# Patient Record
Sex: MALE | Race: BLACK OR AFRICAN AMERICAN | NOT HISPANIC OR LATINO | Employment: UNEMPLOYED | URBAN - METROPOLITAN AREA
[De-identification: names, ages, dates, MRNs, and addresses within clinical notes are randomized per-mention and may not be internally consistent; named-entity substitution may affect disease eponyms.]

---

## 2022-02-04 ENCOUNTER — HOSPITAL ENCOUNTER (EMERGENCY)
Facility: HOSPITAL | Age: 32
Discharge: HOME | End: 2022-02-04
Attending: EMERGENCY MEDICINE | Admitting: EMERGENCY MEDICINE
Payer: COMMERCIAL

## 2022-02-04 ENCOUNTER — TRANSFERRED RECORDS (OUTPATIENT)
Dept: HEALTH INFORMATION MANAGEMENT | Facility: CLINIC | Age: 32
End: 2022-02-04

## 2022-02-04 ENCOUNTER — APPOINTMENT (OUTPATIENT)
Dept: RADIOLOGY | Facility: HOSPITAL | Age: 32
End: 2022-02-04
Attending: EMERGENCY MEDICINE
Payer: COMMERCIAL

## 2022-02-04 VITALS
DIASTOLIC BLOOD PRESSURE: 99 MMHG | RESPIRATION RATE: 25 BRPM | HEART RATE: 109 BPM | SYSTOLIC BLOOD PRESSURE: 208 MMHG | OXYGEN SATURATION: 98 % | WEIGHT: 235 LBS | BODY MASS INDEX: 30.16 KG/M2 | HEIGHT: 74 IN | TEMPERATURE: 98.5 F

## 2022-02-04 DIAGNOSIS — I10 HYPERTENSION, UNSPECIFIED TYPE: ICD-10-CM

## 2022-02-04 DIAGNOSIS — R07.89 ATYPICAL CHEST PAIN: ICD-10-CM

## 2022-02-04 LAB
ALBUMIN SERPL-MCNC: 4.3 G/DL (ref 3.5–5)
ALP SERPL-CCNC: 65 U/L (ref 45–120)
ALT SERPL W P-5'-P-CCNC: 54 U/L (ref 0–45)
ANION GAP SERPL CALCULATED.3IONS-SCNC: 8 MMOL/L (ref 5–18)
AST SERPL W P-5'-P-CCNC: 33 U/L (ref 0–40)
ATRIAL RATE - MUSE: 109 BPM
BASOPHILS # BLD AUTO: 0.1 10E3/UL (ref 0–0.2)
BASOPHILS NFR BLD AUTO: 1 %
BILIRUB DIRECT SERPL-MCNC: 0.1 MG/DL
BILIRUB SERPL-MCNC: 0.4 MG/DL (ref 0–1)
BUN SERPL-MCNC: 12 MG/DL (ref 8–22)
CALCIUM SERPL-MCNC: 9.8 MG/DL (ref 8.5–10.5)
CHLORIDE BLD-SCNC: 105 MMOL/L (ref 98–107)
CO2 SERPL-SCNC: 28 MMOL/L (ref 22–31)
CREAT SERPL-MCNC: 1.25 MG/DL (ref 0.7–1.3)
DIASTOLIC BLOOD PRESSURE - MUSE: NORMAL MMHG
EOSINOPHIL # BLD AUTO: 0.4 10E3/UL (ref 0–0.7)
EOSINOPHIL NFR BLD AUTO: 6 %
ERYTHROCYTE [DISTWIDTH] IN BLOOD BY AUTOMATED COUNT: 13 % (ref 10–15)
GFR SERPL CREATININE-BSD FRML MDRD: 79 ML/MIN/1.73M2
GLUCOSE BLD-MCNC: 106 MG/DL (ref 70–125)
HCT VFR BLD AUTO: 46 % (ref 40–53)
HGB BLD-MCNC: 15.8 G/DL (ref 13.3–17.7)
HOLD SPECIMEN: NORMAL
IMM GRANULOCYTES # BLD: 0 10E3/UL
IMM GRANULOCYTES NFR BLD: 0 %
INTERPRETATION ECG - MUSE: NORMAL
LIPASE SERPL-CCNC: 32 U/L (ref 0–52)
LYMPHOCYTES # BLD AUTO: 2.6 10E3/UL (ref 0.8–5.3)
LYMPHOCYTES NFR BLD AUTO: 43 %
MAGNESIUM SERPL-MCNC: 1.9 MG/DL (ref 1.8–2.6)
MCH RBC QN AUTO: 32.7 PG (ref 26.5–33)
MCHC RBC AUTO-ENTMCNC: 34.3 G/DL (ref 31.5–36.5)
MCV RBC AUTO: 95 FL (ref 78–100)
MONOCYTES # BLD AUTO: 0.8 10E3/UL (ref 0–1.3)
MONOCYTES NFR BLD AUTO: 13 %
NEUTROPHILS # BLD AUTO: 2.2 10E3/UL (ref 1.6–8.3)
NEUTROPHILS NFR BLD AUTO: 37 %
NRBC # BLD AUTO: 0 10E3/UL
NRBC BLD AUTO-RTO: 0 /100
P AXIS - MUSE: 70 DEGREES
PLATELET # BLD AUTO: 223 10E3/UL (ref 150–450)
POTASSIUM BLD-SCNC: 4 MMOL/L (ref 3.5–5)
PR INTERVAL - MUSE: 156 MS
PROT SERPL-MCNC: 7.8 G/DL (ref 6–8)
QRS DURATION - MUSE: 88 MS
QT - MUSE: 332 MS
QTC - MUSE: 447 MS
R AXIS - MUSE: 97 DEGREES
RBC # BLD AUTO: 4.83 10E6/UL (ref 4.4–5.9)
SODIUM SERPL-SCNC: 141 MMOL/L (ref 136–145)
SYSTOLIC BLOOD PRESSURE - MUSE: NORMAL MMHG
T AXIS - MUSE: -23 DEGREES
TROPONIN I SERPL-MCNC: <0.01 NG/ML (ref 0–0.29)
TSH SERPL DL<=0.005 MIU/L-ACNC: 0.68 UIU/ML (ref 0.3–5)
VENTRICULAR RATE- MUSE: 109 BPM
WBC # BLD AUTO: 6.1 10E3/UL (ref 4–11)

## 2022-02-04 PROCEDURE — 71045 X-RAY EXAM CHEST 1 VIEW: CPT

## 2022-02-04 PROCEDURE — 82248 BILIRUBIN DIRECT: CPT | Performed by: EMERGENCY MEDICINE

## 2022-02-04 PROCEDURE — 85025 COMPLETE CBC W/AUTO DIFF WBC: CPT | Performed by: EMERGENCY MEDICINE

## 2022-02-04 PROCEDURE — 93005 ELECTROCARDIOGRAM TRACING: CPT | Performed by: EMERGENCY MEDICINE

## 2022-02-04 PROCEDURE — 84484 ASSAY OF TROPONIN QUANT: CPT | Performed by: EMERGENCY MEDICINE

## 2022-02-04 PROCEDURE — 83735 ASSAY OF MAGNESIUM: CPT | Performed by: EMERGENCY MEDICINE

## 2022-02-04 PROCEDURE — 36415 COLL VENOUS BLD VENIPUNCTURE: CPT | Performed by: EMERGENCY MEDICINE

## 2022-02-04 PROCEDURE — 84443 ASSAY THYROID STIM HORMONE: CPT | Performed by: EMERGENCY MEDICINE

## 2022-02-04 PROCEDURE — 99285 EMERGENCY DEPT VISIT HI MDM: CPT | Mod: 25

## 2022-02-04 PROCEDURE — 83690 ASSAY OF LIPASE: CPT | Performed by: EMERGENCY MEDICINE

## 2022-02-04 ASSESSMENT — ENCOUNTER SYMPTOMS
DIZZINESS: 0
ABDOMINAL PAIN: 0
SORE THROAT: 0
VOMITING: 0
CHILLS: 0
JOINT SWELLING: 0
FEVER: 0
DIAPHORESIS: 1
NAUSEA: 0
CONFUSION: 0
DIARRHEA: 0
SHORTNESS OF BREATH: 0
HEMATURIA: 0
DYSURIA: 0

## 2022-02-04 ASSESSMENT — MIFFLIN-ST. JEOR: SCORE: 2090.7

## 2022-02-04 NOTE — DISCHARGE INSTRUCTIONS
Start previous meds prescribed and take as directed. Follow up with primary clinic - referral placed, but feel free to see anyone of your choice. Return for new/worsening concerns.

## 2022-02-04 NOTE — ED PROVIDER NOTES
Emergency Department Encounter     Evaluation Date & Time:   2/4/2022 12:55 PM    CHIEF COMPLAINT:  Chest Pain      Triage Note:Pt went to his PMD today  As he was still having high BP despite being on meds. While at the dr, he developed left chest pain.  An ekg was done and it showed sinus tachycardia(hr is 120 in triage now) and t wave abnormality so he was sent here. He denies cp now but he said it comes and goes.           ED COURSE & MEDICAL DECISION MAKING:     ED Course as of 02/04/22 1435   Fri Feb 04, 2022   1411 Labs all unremarkable including neg trop with atypical symptoms for months. Will discharge, pt will start BP meds prescribed today by walk in clinic, primary referral, return precautions.  Pt agreeable.       Pt here from walk-in clinic for further evaluation of ongoing chest pain he's had intermittently for past 2+ months.  Pt was diagnosed with HTN, started single anti-hypertensive (uncertain what) 3 months ago and stopped 2 weeks ago when he ran out.  Pt went to walk-in clinic today to get new Rx. While there, he mentioned this nonradiating left sided chest pain intermittently lasting for a few minutes for 2 months.  Pt with no current pain.  Does report increased sweating in general, but not associated with cp.  Denies any other associated symptoms such as dyspnea, n/v, abdominal pain, numbness/weakness.  Pt not taking anything for symptoms. Denies leg pain/swelling, hx of dvt/pe. HR a little elevated, but he states his pulse is always higher.  Do not feel this is PE given no persistent or pleuritic pain, no hypoxia.  Nothing to suggest aortic disease with good pulses to all extremities, intermittent symptoms for months.  No stimulant/drug abuse.      2:34 PM Pt discharged, instructed on starting new medications, importance of outpatient follow up and return precautions.  Does not have a concerning chest pain story at this time and overall atypical.  His BP improved by 20% without  intervention.     At the conclusion of the encounter I discussed the results of all the tests and the disposition. The questions were answered. The patient or family acknowledged understanding and was agreeable with the care plan.      MEDICATIONS GIVEN IN THE EMERGENCY DEPARTMENT:  Medications - No data to display    NEW PRESCRIPTIONS STARTED AT TODAY'S ED VISIT:  New Prescriptions    No medications on file       HPI   HPI     Paul Cisse is a 31 year old male with a pertinent history of HTN who presents to this ED for evaluation of ongoing HTN and intermittent chest pain for 2 months.  Reports starting BP meds 3 months ago, but stopped 2 weeks ago when he ran out.  Pt went to walk-in clinic today to start new medication. While there, discussed he has intermittent, nonradiating left sided chest pain for past 2 months, lasts 1-2 minutes.  No associated symptoms with pain, but does report increased sweating recently.  Denies cough, fevers, n/v/d, leg pain/swelling, hx of dvt/pe, recent travel/surgery.      REVIEW OF SYSTEMS:  Review of Systems   Constitutional: Positive for diaphoresis. Negative for chills and fever.   HENT: Negative for sore throat.    Eyes: Negative for visual disturbance.   Respiratory: Negative for shortness of breath.    Cardiovascular: Positive for chest pain.   Gastrointestinal: Negative for abdominal pain, diarrhea, nausea and vomiting.   Endocrine: Negative for polyuria.   Genitourinary: Negative for dysuria and hematuria.        - urinary changes     Musculoskeletal: Negative for joint swelling.   Skin: Negative for rash.   Neurological: Negative for dizziness.   Psychiatric/Behavioral: Negative for confusion.   All other systems reviewed and are negative.        Medical History   No past medical history on file.    No past surgical history on file.    Family History   Problem Relation Age of Onset     Hypertension Father        Social History     Tobacco Use     Smoking status: Never  "Smoker     Smokeless tobacco: Not on file   Substance Use Topics     Alcohol use: Not on file     Drug use: No       No current outpatient medications on file.      Physical Exam     Vitals:  BP (!) 208/99   Pulse 109   Temp 98.5  F (36.9  C) (Tympanic)   Resp 25   Ht 1.88 m (6' 2\")   Wt 106.6 kg (235 lb)   SpO2 98%   BMI 30.17 kg/m      PHYSICAL EXAM:   Physical Exam  Vitals and nursing note reviewed.   Constitutional:       General: He is not in acute distress.     Appearance: Normal appearance.   HENT:      Head: Normocephalic and atraumatic.      Nose: Nose normal.      Mouth/Throat:      Mouth: Mucous membranes are moist.   Eyes:      Pupils: Pupils are equal, round, and reactive to light.   Cardiovascular:      Rate and Rhythm: Regular rhythm. Tachycardia present.      Pulses: Normal pulses.           Radial pulses are 2+ on the right side and 2+ on the left side.        Dorsalis pedis pulses are 2+ on the right side and 2+ on the left side.   Pulmonary:      Effort: Pulmonary effort is normal. No respiratory distress.      Breath sounds: Normal breath sounds.   Abdominal:      Palpations: Abdomen is soft.      Tenderness: There is no abdominal tenderness.   Musculoskeletal:      Cervical back: Full passive range of motion without pain and neck supple.      Comments: No calf tenderness or swelling b/l   Skin:     General: Skin is warm.      Findings: No rash.   Neurological:      General: No focal deficit present.      Mental Status: He is alert. Mental status is at baseline.      Comments: Fluent speech, no acute lateralizing deficits   Psychiatric:         Mood and Affect: Mood normal.         Behavior: Behavior normal.       Results     LAB:  All pertinent labs reviewed and interpreted  Labs Ordered and Resulted from Time of ED Arrival to Time of ED Departure   HEPATIC FUNCTION PANEL - Abnormal       Result Value    Bilirubin Total 0.4      Bilirubin Direct 0.1      Protein Total 7.8      Albumin 4.3 "      Alkaline Phosphatase 65      AST 33      ALT 54 (*)    BASIC METABOLIC PANEL - Normal    Sodium 141      Potassium 4.0      Chloride 105      Carbon Dioxide (CO2) 28      Anion Gap 8      Urea Nitrogen 12      Creatinine 1.25      Calcium 9.8      Glucose 106      GFR Estimate 79     LIPASE - Normal    Lipase 32     TSH WITH FREE T4 REFLEX - Normal    TSH 0.68     MAGNESIUM - Normal    Magnesium 1.9     TROPONIN I - Normal    Troponin I <0.01     CBC WITH PLATELETS AND DIFFERENTIAL    WBC Count 6.1      RBC Count 4.83      Hemoglobin 15.8      Hematocrit 46.0      MCV 95      MCH 32.7      MCHC 34.3      RDW 13.0      Platelet Count 223      % Neutrophils 37      % Lymphocytes 43      % Monocytes 13      % Eosinophils 6      % Basophils 1      % Immature Granulocytes 0      NRBCs per 100 WBC 0      Absolute Neutrophils 2.2      Absolute Lymphocytes 2.6      Absolute Monocytes 0.8      Absolute Eosinophils 0.4      Absolute Basophils 0.1      Absolute Immature Granulocytes 0.0      Absolute NRBCs 0.0         RADIOLOGY:  XR Chest Port 1 View   Final Result   IMPRESSION: Lungs are clear. Heart and pulmonary vascularity are normal. No signs of acute disease.                   ECG:  Sinus tach, rate 109, T wave inversions inferolateral leads, but no priors for comparison, no ST abnormalities    PROCEDURES:  Procedures:  none      FINAL IMPRESSION:    ICD-10-CM    1. Hypertension, unspecified type  I10 Primary Care Referral   2. Atypical chest pain  R07.89          Sumeet Mancia DO  Emergency Medicine  [facility] EMERGENCY DEPARTMENT  2/4/2022  1:53 PM         Sumeet Mancia MD  02/04/22 1438

## 2022-02-04 NOTE — ED PROVIDER NOTES
Expected Patient Referral to ED  12:31 PM    Referring Clinic/Provider:  Walk-in    Reason for referral/Clinical facts:  Pt with chest pain, started on BP meds 3 months ago, but stopped 2 weeks ago due to running out.  Pt with some chest pain and diaphoresis for the past couple months.  EKG reportedly abnormal.  Pt refused EMS, coming by private vehicle.      Recommendations provided:  Send to ED for further evaluation    Caller was informed that this institution does possess the capabilities and/or resources to provide for patient and should be transferred to our facility.    Discussed that if direct admit is sought and any hurdles are encountered, this ED would be happy to see the patient and evaluate.    Informed caller that recommendations provided are recommendations based only on the facts provided and that they responsible to accept or reject the advice, or to seek a formal in person consultation as needed and that this ED will see/treat patient should they arrive.      Sumeet Mancia, DO  Emergency Medicine  [facility] EMERGENCY DEPARTMENT  [address] [place] [id number] [phone]       Sumeet Mancia MD  02/04/22 8667